# Patient Record
Sex: MALE | Race: WHITE | NOT HISPANIC OR LATINO | Employment: FULL TIME | URBAN - METROPOLITAN AREA
[De-identification: names, ages, dates, MRNs, and addresses within clinical notes are randomized per-mention and may not be internally consistent; named-entity substitution may affect disease eponyms.]

---

## 2019-10-01 ENCOUNTER — TRANSFERRED RECORDS (OUTPATIENT)
Dept: HEALTH INFORMATION MANAGEMENT | Facility: CLINIC | Age: 44
End: 2019-10-01

## 2019-11-14 ENCOUNTER — HOSPITAL ENCOUNTER (OUTPATIENT)
Dept: PHYSICAL THERAPY | Facility: CLINIC | Age: 44
Setting detail: THERAPIES SERIES
End: 2019-11-14
Attending: PHYSICIAN ASSISTANT
Payer: OTHER MISCELLANEOUS

## 2019-11-14 PROCEDURE — 97110 THERAPEUTIC EXERCISES: CPT | Mod: GP

## 2019-11-14 PROCEDURE — 97161 PT EVAL LOW COMPLEX 20 MIN: CPT | Mod: GP

## 2019-11-14 PROCEDURE — 97535 SELF CARE MNGMENT TRAINING: CPT | Mod: GP

## 2019-11-14 NOTE — PROGRESS NOTES
11/14/19 1300   General Information   Type of Visit Initial OP Ortho PT Evaluation   Start of Care Date 11/14/19   Referring Physician Tabatha Steele   Patient/Family Goals Statement get stronger   Orders Evaluate and Treat   Date of Order 11/11/19   Certification Required? No   Medical Diagnosis s/p cervical spinal fusion   Surgical/Medical history reviewed Yes  (na)   Body Part(s)   Body Part(s) Cervical Spine   Presentation and Etiology   Pertinent history of current problem (include personal factors and/or comorbidities that impact the POC) Pt reports having a cervical disc replacement surgery 10/1/19. He was on a 5# lifting restriction which changed to 20# this week. 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 He would like to do some strengthening before returning to work PT for 3 weeks, then FT after that. He is a . Occasionally has to lift 50#. No longer has numbness in R hand.   Impairments A. Pain;F. Decreased strength and endurance   Functional Limitations perform activities of daily living;perform required work activities;perform desired leisure / sports activities  (lifting, leaning over)   Symptom Location post neck and upper back   Onset date of current episode/exacerbation 10/01/19   Chronicity New   Pain rating (0-10 point scale) Best (/10);Worst (/10)   Best (/10) 1  (currently)   Worst (/10) 7  (lifting, active)   Pain quality A. Sharp;C. Aching  (soreness)   Frequency of pain/symptoms C. With activity   Pain/symptoms are: Worse in the morning   Pain/symptoms exacerbated by C. Lifting;L. Work tasks   Pain/symptoms eased by C. Rest   Progression of symptoms since onset: Improved   Prior Level of Function   Functional Level Prior Comment independent   Current Level of Function   Current Community Support Family/friend caregiver   Patient role/employment history A. 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 No   Have you fallen and had an injury in the past year? No   Is patient a fall risk? No   Abuse Screen (yes response referral indicated)   Feels Unsafe at Home or Work/School no   Feels Threatened by Someone no   Does Anyone Try to Keep You From Having Contact with Others or Doing Things Outside Your Home? no   Physical Signs of Abuse Present no   Cervical Spine   Posture fwd head   Cervical Flexion ROM 28*   Cervical Extension ROM 35*   Cervical Right Side Bending ROM 26*   Cervical Left Side Bending ROM 27*   Cervical Right Rotation ROM 50*   Cervical Left Rotation ROM 49*   Shoulder Shrug (C2-C4) Strength 5/5   Shoulder Abd (C5) Strength R 4+/5, L 5/5   Shoulder Add (C7) Strength 5/5   Shoulder ER (C5, C6) Strength R 4/5, L 5/5   Shoulder IR (C5, C6) Strength 5/5   Elbow Flexion (C5, C6) Strength R 4/5, L 5/5   Elbow Extension (C7) Strength R 4/5, L 5/5   Wrist Extension (C6) Strength R 4/5, L 5/5   Wrist Flexion (C7) Strength R 4/5, L 5/5   Thumb Abd (C8) Strength R 4/5, L 5/5   5th Finger Add (T1) Strength 5/5   Palpation tender ant neck, nontender scalenes, cervical parspinals, UT, levator   Planned Therapy Interventions   Planned Therapy Interventions strengthening;ROM;stretching   Clinical Impression   Criteria for Skilled Therapeutic Interventions Met yes, treatment indicated   PT Diagnosis R UE weakness following cervical disc replacement 10/1/19   Influenced by the following impairments weakness, neck pain   Functional limitations due to impairments lifting, driving, work tasks, sleeping   Clinical Presentation Stable/Uncomplicated   Clinical Presentation Rationale clinical judgement   Clinical Decision Making (Complexity) Low complexity   Therapy Frequency 1 time/week   Predicted Duration of Therapy Intervention (days/wks) 8  weeks   Risk & Benefits of therapy have been explained Yes   Patient, Family & other staff in agreement with plan of care Yes   Education Assessment   Preferred Learning Style Listening;Demonstration;Pictures/video   Barriers to Learning No barriers   ORTHO GOALS   PT Ortho Eval Goals 1;2;3;4   Ortho Goal 1   Goal Identifier 1   Goal Description Pt will be able to sleep without waking with pain.   Target Date 12/12/19   Ortho Goal 2   Goal Identifier 2   Goal Description Pt will be able to drive 1 hour with < 3/10 pain.   Target Date 12/26/19   Ortho Goal 3   Goal Identifier 3   Goal Description Pt will be able to lift 20# chair to counter with < 3/10 pain.   Target Date 01/09/20   Ortho Goal 4   Goal Identifier 4   Goal Description Pt will be independent with HEP for optimal functional recovery.   Target Date 01/09/20   Total Evaluation Time   PT Eval, Low Complexity Minutes (09110) 20     Julita Johnson PT

## 2019-11-21 ENCOUNTER — HOSPITAL ENCOUNTER (OUTPATIENT)
Dept: PHYSICAL THERAPY | Facility: CLINIC | Age: 44
Setting detail: THERAPIES SERIES
End: 2019-11-21
Attending: PHYSICIAN ASSISTANT
Payer: OTHER MISCELLANEOUS

## 2019-11-21 PROCEDURE — 97110 THERAPEUTIC EXERCISES: CPT | Mod: GP

## 2020-01-21 NOTE — PROGRESS NOTES
Pt has not returned after 2 treatment sessions with PT. Current status is unknown. Refer to Initial Eval for last known status. Will discharge PT.     Julita Johnson PT

## 2023-03-03 ENCOUNTER — NURSE TRIAGE (OUTPATIENT)
Dept: NURSING | Facility: CLINIC | Age: 48
End: 2023-03-03
Payer: COMMERCIAL

## 2023-03-03 ENCOUNTER — HOSPITAL ENCOUNTER (EMERGENCY)
Facility: CLINIC | Age: 48
Discharge: HOME | End: 2023-03-03
Attending: PHYSICIAN ASSISTANT | Admitting: PHYSICIAN ASSISTANT
Payer: COMMERCIAL

## 2023-03-03 VITALS
TEMPERATURE: 98 F | HEART RATE: 107 BPM | DIASTOLIC BLOOD PRESSURE: 78 MMHG | SYSTOLIC BLOOD PRESSURE: 113 MMHG | RESPIRATION RATE: 20 BRPM | OXYGEN SATURATION: 97 %

## 2023-03-03 DIAGNOSIS — M62.838 MUSCLE SPASM: ICD-10-CM

## 2023-03-03 DIAGNOSIS — S06.0X0A CONCUSSION WITHOUT LOSS OF CONSCIOUSNESS, INITIAL ENCOUNTER: ICD-10-CM

## 2023-03-03 PROCEDURE — G0463 HOSPITAL OUTPT CLINIC VISIT: HCPCS | Performed by: PHYSICIAN ASSISTANT

## 2023-03-03 RX ORDER — METHOCARBAMOL 500 MG/1
1000 TABLET, FILM COATED ORAL 3 TIMES DAILY
Qty: 24 TABLET | Refills: 0 | Status: SHIPPED | OUTPATIENT
Start: 2023-03-03 | End: 2023-03-09

## 2023-03-03 ASSESSMENT — ENCOUNTER SYMPTOMS
HEADACHES: 1
TREMORS: 0
CARDIOVASCULAR NEGATIVE: 1
RESPIRATORY NEGATIVE: 1
NUMBNESS: 1
DIZZINESS: 1
PHOTOPHOBIA: 1
CONSTITUTIONAL NEGATIVE: 1
WEAKNESS: 0
SPEECH DIFFICULTY: 0

## 2023-03-03 ASSESSMENT — ACTIVITIES OF DAILY LIVING (ADL): ADLS_ACUITY_SCORE: 35

## 2023-03-03 NOTE — ED TRIAGE NOTES
Pt reports surgery in 2019 on vertebrae 5-6 from bulge disk and believes he has nerve damage from it.   Numbness in both arms and both legs have intensified since surgery.      PT reports falling on the ice and hitting head on 2/27/23 experiencing dizziness, light sensativity and headaches. Denies losing consciousness.

## 2023-03-03 NOTE — TELEPHONE ENCOUNTER
Pt calling with concerns about;    Fall on 2/27/23 hit his head on car tire and to his right side  Surgery 3 years ago for disc replacement  Numbness of bilateral arms,legs, but states that was present for significant amount of time before this fall and suspects it is related to a back injury from 3 years ago.    Having dizziness, lightheaded, eye sensitivity, migraines have definitely intensed and today having blurry vision.  Has missed work x 4 days due to headache, eye sensitivity     Pt Denies;  Nausea/vomiting  Signs/symptoms of dehydration  Watery or blood tinged fluid from nose or ears    According to the protocol, patient should go to ED/UC now.   Patient is encouraged to also make shannan Care advice given. Patient verbalizes understanding and agrees with plan of care. Transferred to scheduling.     Blanquita Dickinson RN, Nurse Advisor 12:58 PM 3/3/2023  Reason for Disposition    Concussion suspected and has not been examined by a doctor (or NP/PA)    Severe headache    Additional Information    Negative: ACUTE NEUROLOGIC SYMPTOM and symptom present now    Negative: Knocked out (unconscious) > 1 minute    Negative: Seizure (convulsion) occurred  (Exception: Prior history of seizures and now alert and without Acute Neurologic Symptoms.)    Negative: Neck pain after dangerous injury (e.g., MVA, diving, trampoline, contact sports, fall > 10 feet or 3 meters)  (Exception: Neck pain began > 1 hour after injury.)    Negative: Major bleeding (actively dripping or spurting) that can't be stopped    Negative: Penetrating head injury (e.g., knife, gunshot wound, metal object)    Negative: Sounds like a life-threatening emergency to the triager    Negative: Weakness (i.e., paralysis, loss of muscle strength) of the face, arm or leg on one side of the body    Negative: Loss of speech or garbled speech    Negative: Difficult to awaken or acting confused (e.g., disoriented, slurred speech)    Negative: Sounds like a  life-threatening emergency to the triager    Negative: Diagnosed with a concussion within last 14 days    Negative: Can't remember what happened (amnesia)    Negative: Vomiting once or more    Negative: Watery or blood-tinged fluid dripping from the nose or ears    Negative: ACUTE NEUROLOGIC SYMPTOM and now fine    Negative: Knocked out (unconscious) < 1 minute and now fine    Protocols used: CONCUSSION (MTBI) LESS THAN 14 DAYS AGO FOLLOW-UP CALL-A-OH, HEAD INJURY-A-OH

## 2023-03-03 NOTE — ED PROVIDER NOTES
History     Chief Complaint   Patient presents with     Fall     Numbness     Arms and legs      HPI  Nimesh Coronado is a 47 year old male with past medical history of gout, GERD, hyperlipidemia, low back pain and a bulging disc at the level of C5-6 (s/p surgery with an implantable device placed) who presents for evaluation of a closed head injury and concussion symptoms which have been ongoing since he fell on Monday, 2/27/2023.  States that he slipped on the ice and fell down striking the left side of his scalp against the wheel well of his vehicle.  He does report having some dizziness and light sensitivity and mild headaches since that time.  Denies losing consciousness, no neck pain currently.  He has had more numbness in the arms and legs since the incident occurred (waxing and waning).  Has been taking ibuprofen with limited relief of muscle pain and tension.  No chest pain or shortness of breath, no acute vision changes.  Has muscle tension in the trapezius muscles but no midline tenderness in the cervical, thoracic or lumbar spine.    Allergies:  No Known Allergies    Problem List:    There are no problems to display for this patient.       Past Medical History:    No past medical history on file.    Past Surgical History:    No past surgical history on file.    Family History:    No family history on file.    Social History:  Marital Status:  Single [1]        Medications:    methocarbamol (ROBAXIN) 500 MG tablet      Review of Systems   Constitutional: Negative.    HENT: Negative.    Eyes: Positive for photophobia.   Respiratory: Negative.    Cardiovascular: Negative.    Neurological: Positive for dizziness, numbness and headaches. Negative for tremors, syncope, speech difficulty and weakness.       Physical Exam   BP: 113/78  Pulse: 107  Temp: 98  F (36.7  C)  Resp: 20  SpO2: 97 %      Physical Exam  Constitutional:       General: He is not in acute distress.     Appearance: Normal appearance. He is  not ill-appearing, toxic-appearing or diaphoretic.   HENT:      Head: Normocephalic and atraumatic.      Right Ear: Tympanic membrane, ear canal and external ear normal. There is no impacted cerumen.      Left Ear: Tympanic membrane, ear canal and external ear normal. There is no impacted cerumen.      Nose: Nose normal. No congestion or rhinorrhea.      Mouth/Throat:      Mouth: Mucous membranes are moist.      Pharynx: Oropharynx is clear. No oropharyngeal exudate or posterior oropharyngeal erythema.   Eyes:      General: No visual field deficit or scleral icterus.     Extraocular Movements: Extraocular movements intact.      Right eye: Normal extraocular motion and no nystagmus.      Left eye: Normal extraocular motion and no nystagmus.      Pupils: Pupils are equal, round, and reactive to light. Pupils are equal.      Right eye: Pupil is reactive and not sluggish.      Left eye: Pupil is reactive and not sluggish.      Funduscopic exam:     Right eye: Red reflex present.         Left eye: Red reflex present.  Cardiovascular:      Rate and Rhythm: Normal rate and regular rhythm.      Pulses: Normal pulses.      Heart sounds: Normal heart sounds. No murmur heard.  Pulmonary:      Effort: Pulmonary effort is normal. No respiratory distress.      Breath sounds: Normal breath sounds. No stridor. No wheezing, rhonchi or rales.   Chest:      Chest wall: No tenderness.   Abdominal:      General: Abdomen is flat. Bowel sounds are normal. There is no distension.      Palpations: Abdomen is soft. There is no mass.      Tenderness: There is no abdominal tenderness. There is no guarding or rebound.   Musculoskeletal:         General: Normal range of motion.      Cervical back: Normal range of motion and neck supple. No rigidity. No muscular tenderness.   Lymphadenopathy:      Cervical: No cervical adenopathy.   Skin:     General: Skin is warm and dry.   Neurological:      General: No focal deficit present.      Mental  Status: He is alert and oriented to person, place, and time.      Cranial Nerves: Cranial nerves 2-12 are intact. No cranial nerve deficit, dysarthria or facial asymmetry.      Motor: Motor function is intact.      Coordination: Coordination is intact. Romberg sign negative. Coordination normal. Finger-Nose-Finger Test and Heel to Shin Test normal.      Gait: Gait is intact. Gait and tandem walk normal.      Deep Tendon Reflexes: Reflexes normal.      Reflex Scores:       Bicep reflexes are 2+ on the right side and 2+ on the left side.       Brachioradialis reflexes are 2+ on the right side and 2+ on the left side.       Patellar reflexes are 2+ on the right side and 2+ on the left side.     Comments: Negative Adson's test bilaterally.  Strength is 5/5 in the upper extremities.  Normal sensation to light touch in the upper and lower extremities.         ED Course                 Procedures              No results found for this or any previous visit (from the past 24 hour(s)).    Medications - No data to display    Assessments & Plan (with Medical Decision Making)     Patient is a 47-year-old male who presents for evaluation of concussion symptoms including mild headaches, light sensitivity and occasional unsteadiness since a fall which occurred on 3/27/2022.  He denies having any dizziness currently.  Symptoms are consistent with a mild concussion.  Also has spasming of the trapezius muscles bilaterally.  No midline tenderness over the cervical, thoracic or lumbar spine.  No neuromuscular deficits.  Normal strength and sensation to light touch in the upper extremities and lower extremities.    Provided the patient with a concussion referral for further evaluation and management.  Does not meet C-spine rules or [place] head CT rules requiring urgent imaging.  Recommend rest, fluids, and light activities as tolerated for the next 2 days.   Avoid alcohol consumption, operating equipment or driving over the next 2  days.  Follow-up with primary care as scheduled on March 16 for further evaluation and management.    May take ibuprofen/or Tylenol for symptomatic relief of pain.    Prescribed robaxin, which is a muscle relaxant.  Muscle relaxants will make you feel drowsy.  No working, driving, or operating equipment for 8 hours from your last dose of muscle relaxants.     Return to the emergency department for urgent evaluation if you develop symptoms such as repeated vomiting, headache or dizziness, loss of consciousness, unusual or worsening drowsiness, weakness or decreased ability to walk or move, speech or behavior changes, worsening blurry vision, convulsions or seizures, swelling on the face or scalp that gets worse, changes in pupil size, or fluid draining from the nose or ears.    I have reviewed the nursing notes.    I have reviewed the findings, diagnosis, plan and need for follow up with the patient.      Discharge Medication List as of 3/3/2023  1:44 PM      START taking these medications    Details   methocarbamol (ROBAXIN) 500 MG tablet Take 2 tablets (1,000 mg) by mouth 3 times daily for 4 days, Disp-24 tablet, R-0, E-Prescribe             Final diagnoses:   Concussion without loss of consciousness, initial encounter   Muscle spasm       3/3/2023   [facility] EMERGENCY DEPT     Hank Marroquin PA-C  03/04/23 [id number]

## 2023-03-03 NOTE — DISCHARGE INSTRUCTIONS
Recommend rest, fluids, and light activities as tolerated for the next 2 days.   Avoid alcohol consumption, operating equipment or driving over the next 2 days.    May take ibuprofen/or Tylenol for symptomatic relief of pain.    Prescribed robaxin, which is a muscle relaxant.  Muscle relaxants will make you feel drowsy.  No working, driving, or operating equipment for 8 hours from your last dose of muscle relaxants.     Return to the emergency department for urgent evaluation if you develop symptoms such as repeated vomiting, headache or dizziness, loss of consciousness, unusual or worsening drowsiness, weakness or decreased ability to walk or move, speech or behavior changes, worsening blurry vision, convulsions or seizures, swelling on the face or scalp that gets worse, changes in pupil size, or fluid draining from the nose or ears.

## 2023-03-03 NOTE — LETTER
[facility] EMERGENCY DEPT  [address] [address]  Phone: [phone]  Fax: [phone]    March 3, 2023        Nimesh Coronado  [address] [address]          To whom it may concern:    RE: Nimesh Coronado    Patient was seen and treated today at our clinic.  Medical reasons, recommend that he remain out of work until March 6, 2023.  May resume light duties at that time until further evaluated.    Please contact me for questions or concerns.      Sincerely,        Hank Marroquin PA-C

## 2023-03-09 ENCOUNTER — OFFICE VISIT (OUTPATIENT)
Dept: FAMILY MEDICINE | Facility: CLINIC | Age: 48
End: 2023-03-09
Payer: COMMERCIAL

## 2023-03-09 ENCOUNTER — TELEPHONE (OUTPATIENT)
Dept: NEUROLOGY | Facility: CLINIC | Age: 48
End: 2023-03-09

## 2023-03-09 ENCOUNTER — HOSPITAL ENCOUNTER (OUTPATIENT)
Dept: CT IMAGING | Facility: CLINIC | Age: 48
Discharge: HOME | End: 2023-03-09
Attending: NURSE PRACTITIONER | Admitting: NURSE PRACTITIONER
Payer: COMMERCIAL

## 2023-03-09 VITALS
SYSTOLIC BLOOD PRESSURE: 138 MMHG | RESPIRATION RATE: 16 BRPM | BODY MASS INDEX: 32.83 KG/M2 | HEART RATE: 99 BPM | HEIGHT: 67 IN | WEIGHT: 209.2 LBS | DIASTOLIC BLOOD PRESSURE: 76 MMHG | TEMPERATURE: 97.9 F | OXYGEN SATURATION: 98 %
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
## 2023-03-09 DIAGNOSIS — R20.0 NUMBNESS AND TINGLING OF BOTH UPPER EXTREMITIES: ICD-10-CM

## 2023-03-09 DIAGNOSIS — R29.810 WEAKNESS OF FACE MUSCLES: ICD-10-CM

## 2023-03-09 DIAGNOSIS — R20.0 NUMBNESS AND TINGLING OF BOTH LOWER EXTREMITIES: ICD-10-CM

## 2023-03-09 DIAGNOSIS — R29.90 ALTERATION IN NEUROLOGICAL STATUS IN ADULT: ICD-10-CM

## 2023-03-09 DIAGNOSIS — R20.2 NUMBNESS AND TINGLING OF BOTH UPPER EXTREMITIES: ICD-10-CM

## 2023-03-09 DIAGNOSIS — R20.2 NUMBNESS AND TINGLING OF BOTH LOWER EXTREMITIES: ICD-10-CM
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
## 2023-03-09 PROCEDURE — 99214 OFFICE O/P EST MOD 30 MIN: CPT | Performed by: NURSE PRACTITIONER

## 2023-03-09 PROCEDURE — 70450 CT HEAD/BRAIN W/O DYE: CPT

## 2023-03-09 RX ORDER — IBUPROFEN 200 MG
200 TABLET ORAL EVERY 4 HOURS PRN
COMMUNITY

## 2023-03-09 ASSESSMENT — PATIENT HEALTH QUESTIONNAIRE - PHQ9
10. IF YOU CHECKED OFF ANY PROBLEMS, HOW DIFFICULT HAVE THESE PROBLEMS MADE IT FOR YOU TO DO YOUR WORK, TAKE CARE OF THINGS AT HOME, OR GET ALONG WITH OTHER PEOPLE: VERY DIFFICULT
5. POOR APPETITE OR OVEREATING: NEARLY EVERY DAY
SUM OF ALL RESPONSES TO PHQ QUESTIONS 1-9: 22
SUM OF ALL RESPONSES TO PHQ QUESTIONS 1-9: 22

## 2023-03-09 ASSESSMENT — ANXIETY QUESTIONNAIRES
GAD7 TOTAL SCORE: 21
3. WORRYING TOO MUCH ABOUT DIFFERENT THINGS: NEARLY EVERY DAY
2. NOT BEING ABLE TO STOP OR CONTROL WORRYING: NEARLY EVERY DAY
6. BECOMING EASILY ANNOYED OR IRRITABLE: NEARLY EVERY DAY
5. BEING SO RESTLESS THAT IT IS HARD TO SIT STILL: NEARLY EVERY DAY
7. FEELING AFRAID AS IF SOMETHING AWFUL MIGHT HAPPEN: NEARLY EVERY DAY
GAD7 TOTAL SCORE: 21
IF YOU CHECKED OFF ANY PROBLEMS ON THIS QUESTIONNAIRE, HOW DIFFICULT HAVE THESE PROBLEMS MADE IT FOR YOU TO DO YOUR WORK, TAKE CARE OF THINGS AT HOME, OR GET ALONG WITH OTHER PEOPLE: VERY DIFFICULT
1. FEELING NERVOUS, ANXIOUS, OR ON EDGE: NEARLY EVERY DAY

## 2023-03-09 ASSESSMENT — PAIN SCALES - GENERAL: PAINLEVEL: SEVERE PAIN (7)

## 2023-03-09 NOTE — TELEPHONE ENCOUNTER
Health Call Center    Phone Message    May a detailed message be left on voicemail: yes     Reason for Call: Other: Pt is calling to schedule appt for concussion. Pt is scheduled with Corinne on 05/24 but referral is marked urgent 3-5 days. Please assist pt in finding a sooner appt. Pt is okay going to [place] but preferred location is [place].     Action Taken: Message routed to:  Other: [facility]    Travel Screening: Not Applicable

## 2023-03-09 NOTE — PATIENT INSTRUCTIONS
Make appointment with radiology by calling [phone] for MRI of brain and MRA of brain as well.  I will notify you of results.  Make appointment with neurology for evaluation.  Follow-up in ER if any significant changes.

## 2023-03-09 NOTE — PROGRESS NOTES
Assessment & Plan     Fall, initial encounter  Patient has some right lower facial neuro changes with corner of mouth turned down but normal upper facial movement in the eyes not consistent with Bell's palsy, weakness is bilateral and neuro exam is negative except the right side mouth drooping.  He also is having numbness and tingling with some weakness in both arms and legs not consistent with his cervical history.  Due to fall and concussion ordered CT head without contrast for rule out subdural hematoma or other cause for changes in neuro exam.  This was negative.  Plan to follow-up with MRI of brain and MRA of brain in for further evaluation since he is stable.  Also refer to neurology since this is abnormal in concussion recovery.  I have advised patient it would probably be beneficial for him also to follow-up with the concussion clinic since he did have a referral and this number was given to him as well.  I will be in touch with him once he completes his MRI and MRA with results as soon as he can get it.  - CT Head w/o Contrast; Future    Weakness of face muscles  - CT Head w/o Contrast; Future  - MR Brain w/o & w Contrast; Future  - MRA Brain ([place]) wo & w Contrast; Future  - Adult Neurology  Referral; Future    Numbness and tingling of both lower extremities  - CT Head w/o Contrast; Future  - MR Brain w/o & w Contrast; Future  - MRA Brain ([place]) wo & w Contrast; Future  - Adult Neurology  Referral; Future    Numbness and tingling of both upper extremities  - CT Head w/o Contrast; Future  - MR Brain w/o & w Contrast; Future  - MRA Brain ([place]) wo & w Contrast; Future  - Adult Neurology  Referral; Future    Alteration in neurological status in adult  - MR Brain w/o & w Contrast; Future  - MRA Brain ([place]) wo & w Contrast; Future  - Adult Neurology  Referral; Future    MED REC REQUIRED  Post Medication Reconciliation Status:  discharge medications reconciled, continue medications without change    Depression Screening Follow Up    PHQ 3/9/2023   PHQ-9 Total Score 22   Q9: Thoughts of better off dead/self-harm past 2 weeks Several days   F/U: Thoughts of suicide or self-harm Yes   F/U: Self harm-plan No   F/U: Self-harm action No   F/U: Safety concerns No     Patient declined discussion of mood today and will postpone until he is feeling better with his dizziness and weakness.    Paulina Alejandro NP  [facility] is a 47 year old, presenting for the following health issues:  Anxiety, Fall, Hospital F/U, and Depression    Patient declined vaccines today .  History of Present Illness       Reason for visit:  Fall    He eats 0-1 servings of fruits and vegetables daily.He consumes 6 sweetened beverage(s) daily.He exercises with enough effort to increase his heart rate 9 or less minutes per day.  He exercises with enough effort to increase his heart rate 3 or less days per week. He is missing 1 dose(s) of medications per week.  He is not taking prescribed medications regularly due to side effects.    Today's PHQ-9         PHQ-9 Total Score: 22    PHQ-9 Q9 Thoughts of better off dead/self-harm past 2 weeks :   Several days  Thoughts of suicide or self harm: (P) Yes  Self-harm Plan:   (P) No  Self-harm Action:     (P) No  Safety concerns for self or others: (P) No    How difficult have these problems made it for you to do your work, take care of things at home, or get along with other people: Very difficult    Patient presents today with depression on his PHQ-9 score.  Patient denies any suicidal plan.  He does not feel that this is his main concern at this time and will make appointment to readdress when he feels improved from his recent fall.    Hospital Follow-up Visit:    Hospital/Nursing Home/IP Rehab Facility: [facility]  Date of Admission: 03/03/2023  Date of  Discharge: 03/03/2023  Reason(s) for Admission: Concussion without loss of consciousness    Was your hospitalization related to COVID-19? No   Problems taking medications regularly:  None  Medication changes since discharge: None  Problems adhering to non-medication therapy:  None    Summary of hospitalization:  [facility] discharge summary reviewed  Diagnostic Tests/Treatments reviewed.  Follow up needed: Patient is having numbness tingling down arms and legs, weakness in arms and legs bilaterally, and continues to have headaches and dizziness with movement.  He also states he is feeling some sensitivity to light.  Other Healthcare Providers Involved in Patient s Care:         None  Update since discharge: worsened.    Plan of care communicated with patient        Social History     Tobacco Use     Smoking status: Never     Smokeless tobacco: Never   Vaping Use     Vaping Use: Never used     OMARI-7 SCORE 3/9/2023   Total Score 21     PHQ 3/9/2023   PHQ-9 Total Score 22   Q9: Thoughts of better off dead/self-harm past 2 weeks Several days   F/U: Thoughts of suicide or self-harm Yes   F/U: Self harm-plan No   F/U: Self-harm action No   F/U: Safety concerns No     Last PHQ-9 3/9/2023   1.  Little interest or pleasure in doing things 2   2.  Feeling down, depressed, or hopeless 3   3.  Trouble falling or staying asleep, or sleeping too much 3   4.  Feeling tired or having little energy 3   5.  Poor appetite or overeating 3   6.  Feeling bad about yourself 2   7.  Trouble concentrating 3   8.  Moving slowly or restless 2   Q9: Thoughts of better off dead/self-harm past 2 weeks 1   PHQ-9 Total Score 22   In the past two weeks have you had thoughts of suicide or self harm? Yes   Do you have concerns about your personal safety or the safety of others? No   In the past 2 weeks have you thought about a plan or had intention to harm yourself? No   In the past 2 weeks have you acted on these thoughts in any way?  "No     OMARI-7  3/9/2023   1. Feeling nervous, anxious, or on edge 3   2. Not being able to stop or control worrying 3   3. Worrying too much about different things 3   4. Trouble relaxing 3   5. Being so restless that it is hard to sit still 3   6. Becoming easily annoyed or irritable 3   7. Feeling afraid, as if something awful might happen 3   OMARI-7 Total Score 21   If you checked any problems, how difficult have they made it for you to do your work, take care of things at home, or get along with other people? Very difficult     Review of Systems   CONSTITUTIONAL: NEGATIVE for fever, chills, change in weight  RESP: NEGATIVE for significant cough or SOB  CV: NEGATIVE for chest pain, palpitations or peripheral edema  MUSCULOSKELETAL: POSITIVE  for paresthesias and weakness in arms and legs bilaterally  NEURO: POSITIVE for headaches and sensitivity to light  along with dizziness with movement and looking up.  PSYCHIATRIC: POSITIVE fordepressed mood  ROS otherwise negative      Objective    /76   Pulse 99   Temp 97.9  F (36.6  C) (Tympanic)   Resp 16   Ht 1.702 m (5' 7\")   Wt 94.9 kg (209 lb 3.2 oz)   SpO2 98%   BMI 32.77 kg/m    Body mass index is 32.77 kg/m .  Physical Exam   GENERAL: healthy, alert and no distress  EYES: Eyes grossly normal to inspection and dizziness occurred with following my finger towards his eyes  HENT: ear canals and TM's normal, nose and mouth without ulcers or lesions  NECK: no adenopathy and no asymmetry, masses, or scars  RESP: lungs clear to auscultation - no rales, rhonchi or wheezes  CV: regular rate and rhythm, normal S1 S2, no S3 or S4, no murmur, click or rub, no peripheral edema and peripheral pulses strong  MS: RUE exam shows ROM of all joints is normal, LUE exam shows ROM of all joints is normal, RLE exam shows ROM of all joints is normal and LLE exam shows ROM of all joints is normal; generalized weakness in upper and lower extremities, paresthesias are intermittent " and not occurring currently  NEURO: Normal strength and tone, sensory exam grossly normal, mentation intact, speech normal, cranial nerves 2-12 intact except for corner of right side of the mouth not going up with smiling and Romberg normal  Comprehensive back pain exam:  No tenderness, Range of motion not limited by pain, Lower extremity strength functional and equal on both sides, Lower extremity reflexes within normal limits bilaterally, Lower extremity sensation normal and equal on both sides and Straight leg raise negative bilaterally  PSYCH: mentation appears normal and affect flat    CT Head w/o Contrast    Result Date: 3/9/2023    CT SCAN OF THE HEAD WITHOUT CONTRAST   3/9/2023 9:40 AM HISTORY: Fall, initial encounter. Weakness of face muscles. Numbness and tingling of both lower extremities. Numbness and tingling of both upper extremities.     TECHNIQUE:  Axial images of the head and coronal reformations without IV contrast material. Radiation dose for this scan was reduced using automated exposure control, adjustment of the mA and/or kV according to patient size, or iterative reconstruction technique.     COMPARISON: None.     FINDINGS: No evidence of ischemia, hemorrhage, mass, mass effect, or hydrocephalus. Parenchyma within normal limits for age. No acute osseous abnormality.     IMPRESSION: No acute intracranial abnormality. Findings were called to Paulina Alejandro at 9:47 AM on 3/9/2023     FRANCISCA FREITAS MD   SYSTEM ID:  [id number]

## 2023-03-09 NOTE — LETTER
[facility]  [address]  [place] [address]  Phone: [phone]    March 9, 2023        Nimesh Coronado  [address]  [place] [address]          To whom it may concern:    RE: Nimesh Coronado    Patient was seen and treated today at our clinic.  He suffered concussion and has residual symptoms.  He will not be able to return to work until he is not having light sensitivity, headaches, or dizziness persistently.  At this time, he is undergoing further evaluation and follow-up with specialist.  If symptoms improve, he can start with shorter shifts of 4 hours and increase up to 8 hour and then 12 hours as able without symptoms.  Follow concussion return to work policy if you have one.    Please contact me for questions or concerns.      Sincerely,        Paulina Alejandro NP

## 2023-03-13 ENCOUNTER — HOSPITAL ENCOUNTER (OUTPATIENT)
Dept: MRI IMAGING | Facility: CLINIC | Age: 48
Discharge: HOME | End: 2023-03-13
Attending: NURSE PRACTITIONER
Payer: COMMERCIAL

## 2023-03-13 DIAGNOSIS — R20.2 NUMBNESS AND TINGLING OF BOTH UPPER EXTREMITIES: ICD-10-CM

## 2023-03-13 DIAGNOSIS — R29.90 ALTERATION IN NEUROLOGICAL STATUS IN ADULT: ICD-10-CM

## 2023-03-13 DIAGNOSIS — R20.0 NUMBNESS AND TINGLING OF BOTH UPPER EXTREMITIES: ICD-10-CM

## 2023-03-13 DIAGNOSIS — R20.2 NUMBNESS AND TINGLING OF BOTH LOWER EXTREMITIES: ICD-10-CM

## 2023-03-13 DIAGNOSIS — R29.810 WEAKNESS OF FACE MUSCLES: ICD-10-CM

## 2023-03-13 DIAGNOSIS — R20.0 NUMBNESS AND TINGLING OF BOTH LOWER EXTREMITIES: ICD-10-CM

## 2023-03-13 PROCEDURE — 255N000002 HC RX 255 OP 636: Performed by: NURSE PRACTITIONER

## 2023-03-13 PROCEDURE — A9585 GADOBUTROL INJECTION: HCPCS | Performed by: NURSE PRACTITIONER

## 2023-03-13 PROCEDURE — 70553 MRI BRAIN STEM W/O & W/DYE: CPT

## 2023-03-13 PROCEDURE — 70544 MR ANGIOGRAPHY HEAD W/O DYE: CPT | Mod: XU

## 2023-03-13 RX ORDER — GADOBUTROL 604.72 MG/ML
9 INJECTION INTRAVENOUS ONCE
Status: COMPLETED | OUTPATIENT
Start: 2023-03-13 | End: 2023-03-13

## 2023-03-13 RX ADMIN — GADOBUTROL 9 ML: 604.72 INJECTION INTRAVENOUS at 17:54

## 2023-03-14 ENCOUNTER — TELEPHONE (OUTPATIENT)
Dept: FAMILY MEDICINE | Facility: CLINIC | Age: 48
End: 2023-03-14
Payer: COMMERCIAL

## 2023-03-15 NOTE — TELEPHONE ENCOUNTER
[facility] release of information form received from patient. Form is signed. There is no other form attached, just a release of information. Form is sent to [facility]KA and copy placed in cabinet and copy sent to scan.

## 2023-03-16 ENCOUNTER — VIRTUAL VISIT (OUTPATIENT)
Dept: NEUROLOGY | Facility: CLINIC | Age: 48
End: 2023-03-16
Attending: PHYSICIAN ASSISTANT
Payer: COMMERCIAL

## 2023-03-16 DIAGNOSIS — S06.0X0A CONCUSSION WITHOUT LOSS OF CONSCIOUSNESS, INITIAL ENCOUNTER: ICD-10-CM

## 2023-03-16 DIAGNOSIS — F07.81 POST CONCUSSION SYNDROME: Primary | ICD-10-CM

## 2023-03-16 DIAGNOSIS — M54.2 NECK PAIN: ICD-10-CM

## 2023-03-16 DIAGNOSIS — G47.00 INSOMNIA, UNSPECIFIED TYPE: ICD-10-CM

## 2023-03-16 DIAGNOSIS — R41.840 ATTENTION AND CONCENTRATION DEFICIT: ICD-10-CM

## 2023-03-16 PROCEDURE — 99205 OFFICE O/P NEW HI 60 MIN: CPT | Mod: VID | Performed by: NURSE PRACTITIONER

## 2023-03-16 RX ORDER — AMITRIPTYLINE HYDROCHLORIDE 50 MG/1
50-100 TABLET ORAL AT BEDTIME
Qty: 60 TABLET | Refills: 3 | Status: SHIPPED | OUTPATIENT
Start: 2023-03-16

## 2023-03-16 RX ORDER — BUPROPION HYDROCHLORIDE 150 MG/1
150 TABLET ORAL EVERY MORNING
Qty: 30 TABLET | Refills: 3 | Status: SHIPPED | OUTPATIENT
Start: 2023-03-16

## 2023-03-16 RX ORDER — METHOCARBAMOL 500 MG/1
500 TABLET, FILM COATED ORAL 4 TIMES DAILY PRN
COMMUNITY
End: 2023-03-22

## 2023-03-16 ASSESSMENT — PATIENT HEALTH QUESTIONNAIRE - PHQ9
SUM OF ALL RESPONSES TO PHQ QUESTIONS 1-9: 18
10. IF YOU CHECKED OFF ANY PROBLEMS, HOW DIFFICULT HAVE THESE PROBLEMS MADE IT FOR YOU TO DO YOUR WORK, TAKE CARE OF THINGS AT HOME, OR GET ALONG WITH OTHER PEOPLE: EXTREMELY DIFFICULT
SUM OF ALL RESPONSES TO PHQ QUESTIONS 1-9: 18
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
## 2023-03-16 NOTE — NURSING NOTE
CONCUSSION SYMPTOMS ASSESSMENT 3/16/2023   Headache or Pressure In Head 3 - moderate   Upset Stomach or Throwing Up 0 - none   Problems with Balance 3 - moderate   Feeling Dizzy 1 - mild   Sensitivity to Light 4 - moderate to severe   Sensitivity to Noise 1 - mild   Mood Changes 4 - moderate to severe   Feeling sluggish, hazy, or foggy 3 - moderate   Trouble Concentrating, Lack of Focus 4 - moderate to severe   Motion Sickness 0 - none   Vision Changes 0 - none   Memory Problems 1 - mild   Feeling Confused 3 - moderate   Neck Pain 4 - moderate to severe   Trouble Sleeping 5 - severe   Total Number of Symptoms 12   Symptom Severity Score 36       Is the patient currently in the state of [place]? YES    Visit mode:VIDEO    If the visit is dropped, the patient can be reconnected by: VIDEO VISIT: Text to cell phone: [phone]    Will anyone else be joining the visit? NO      How would you like to obtain your AVS? MyChart    Are changes needed to the allergy or medication list? NO    Reason for visit: Concussion
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
## 2023-03-20 ENCOUNTER — TELEPHONE (OUTPATIENT)
Dept: FAMILY MEDICINE | Facility: CLINIC | Age: 48
End: 2023-03-20
Payer: COMMERCIAL

## 2023-03-20 ENCOUNTER — TELEPHONE (OUTPATIENT)
Dept: ORTHOPEDICS | Facility: CLINIC | Age: 48
End: 2023-03-20
Payer: COMMERCIAL

## 2023-03-20 NOTE — TELEPHONE ENCOUNTER
Pt is experience next pain with numbness and tingling in both arms and hands. HX surgery through [facility] for a C5 disc replacement in 2019. Pt is schedule with Dr. Rodriguez on 3/22, please advise if appt is appropriate or does pt need to reschedule?

## 2023-03-20 NOTE — TELEPHONE ENCOUNTER
Action March 20, 2023 1:28 PM [place]   Action Taken Sent a request for imaging from  [phone] and [facility] [phone] .     Action March 20, 2023 4:30 PM MT   Action Taken Imaging resolved.       DIAGNOSIS: Neck Pain   APPOINTMENT DATE: 03/22/2023   NOTES STATUS DETAILS   OFFICE NOTE from referring provider Internal 03/16/2023 - Corinne Silva CNP - [facility] Neuro    OFFICE NOTE from other specialist Internal  Care Everywhere 03/09/2023 - Paulina Alejandro NP - [facility] FP    01/08/2020 - Guido Yu MD - [facility]    11/11/2019 - Tabatha White PA-C - [facility]    More..   DIS[facility] RE[facility] the ER [facility]   OPERATIVE REPORT Care Everywhere 10/01/2019 - C6-C7 Disk Arthroplasty   MEDICATION LIST Internal  Care Everywhere    IMPLANT RECORD/STICKER Care Everywhere    LABS     INJECTIONS DONE IN RADIOLOGY PACS [facility]:  07/30/2019, 06/21/2019   MRI PACS [facility]:  05/24/2019- [facility]   XRAYS (IMAGES & REPORTS) PACS [facility]:  01/08/2020, 11/11/2019, 10/10/2019 - [facility]    [facility]:  10/01/2019 - [facility]

## 2023-03-20 NOTE — TELEPHONE ENCOUNTER
Patient wondering if records have been sent to [facility] as he completed a authorization to release information on 3/17/23.     Informed patient they have not been sent yet, form sent to [facility] for processing.    Patient to check back in a couple days.    Julie Behrendt RN

## 2023-03-22 ENCOUNTER — OFFICE VISIT (OUTPATIENT)
Dept: ORTHOPEDICS | Facility: CLINIC | Age: 48
End: 2023-03-22
Attending: NURSE PRACTITIONER
Payer: COMMERCIAL

## 2023-03-22 ENCOUNTER — PRE VISIT (OUTPATIENT)
Dept: ORTHOPEDICS | Facility: CLINIC | Age: 48
End: 2023-03-22

## 2023-03-22 ENCOUNTER — ANCILLARY PROCEDURE (OUTPATIENT)
Dept: GENERAL RADIOLOGY | Facility: CLINIC | Age: 48
End: 2023-03-22
Attending: ORTHOPAEDIC SURGERY
Payer: COMMERCIAL

## 2023-03-22 VITALS — WEIGHT: 208 LBS | HEIGHT: 67 IN | BODY MASS INDEX: 32.65 KG/M2

## 2023-03-22 DIAGNOSIS — M54.12 CERVICAL RADICULOPATHY: ICD-10-CM

## 2023-03-22 DIAGNOSIS — M48.02 CERVICAL SPINAL STENOSIS: ICD-10-CM

## 2023-03-22 DIAGNOSIS — M54.2 NECK PAIN: ICD-10-CM

## 2023-03-22 DIAGNOSIS — F07.81 POST CONCUSSION SYNDROME: ICD-10-CM

## 2023-03-22 DIAGNOSIS — M54.2 NECK PAIN: Primary | ICD-10-CM

## 2023-03-22 DIAGNOSIS — G95.9 MYELOPATHY (H): Primary | ICD-10-CM

## 2023-03-22 DIAGNOSIS — M50.30 DEGENERATIVE, INTERVERTEBRAL DISC, CERVICAL: ICD-10-CM

## 2023-03-22 PROCEDURE — 99204 OFFICE O/P NEW MOD 45 MIN: CPT | Performed by: ORTHOPAEDIC SURGERY

## 2023-03-22 PROCEDURE — 72050 X-RAY EXAM NECK SPINE 4/5VWS: CPT | Mod: GC | Performed by: RADIOLOGY

## 2023-03-22 RX ORDER — METHOCARBAMOL 500 MG/1
500 TABLET, FILM COATED ORAL 4 TIMES DAILY PRN
Qty: 30 TABLET | Refills: 3 | Status: SHIPPED | OUTPATIENT
Start: 2023-03-22

## 2023-03-22 RX ORDER — GABAPENTIN 300 MG/1
300 CAPSULE ORAL 3 TIMES DAILY
Qty: 60 CAPSULE | Refills: 3 | Status: SHIPPED | OUTPATIENT
Start: 2023-03-22

## 2023-03-22 NOTE — LETTER
3/22/2023         RE: Nimesh Coronado  [address]  [facility] [address]        Dear Colleague,    Thank you for referring your patient, Nimesh Coronado, to the [facility] [place]. Please see a copy of my visit note below.    Chief concern: Cervical radiculopathy, loss of dexterity and balance     History of present illness:  Nimesh Coronado is a pleasant 47-year-old male with history of 2019 C6-7 cervical disc replacement with Dr. Yu. Had improved symptoms up until this winter when he started having intermittent pains and numbness in BUE.  He had contacted Dr. Yu's office to get an appointment unfortunately were earliest available time was March 16 and 1 week prior to his appointment they called and canceled that.  He therefore reached out and scheduled for evaluation here.  He describes numbness bilaterally in the C6-C7 distribution of the hands, pain dorsal forearms, pain in neck.  Symptoms are made difficult to sleep, standard drive, work in his job as a .  Alleviating factors have included pain medications muscle relaxants.  He had recent physical therapy or epidural injection.  He has not had gabapentin or Neurontin.    On examination is alert and oriented no acute distress  Nonlabored respiration with no audible wheeze  Walks without assistive device without obvious instability  Cervical range of motion is Flexion/extension is 35/40, Lateral rotation is 50 L, 55 R, Side bend is 45 L, 40 R  Resisted strength testing reveals 5/5 strength with shoulder elevation, 5 -/5 shoulder abduction bilaterally, elbow flexion is 4/5 on the right 5 -/5 in the left, elbow extension is 5 -/5 on the right and 5/5 in the left, wrist flexion extension 5 -/5 on the right 5/5 in the left, finger abduction and composite  are 5/5 bilaterally  Sensation is grossly intact gliding light touch C4-T1.  No evidence of hyperreflexia at the bicep tricep or brachioradialis.  He has  negative Ifrah's bilaterally.  He has 2+ patellar tendon reflexes and no sustained clonus  Negative Romberg    Imaging review including AP lateral flexion-extension views of the cervical spine reveals C6-7 cervical disc replacement which has 7 degree change in intervertebral angle between flexion-extension.  There is mild spondylosis at C5-6.  Review of his 2019 MRI did show a predominantly right-sided C5-6 small disc herniation as well as a very large predominately right-sided C6-7 disc herniation which was presumably treated with his discectomy and disc replacement.    Impression and plan:  47-year-old male with history of 2019 C6-7 disc replacement with Dr. Yu who presents with symptoms of cervical radiculopathy and myelopathic symptoms.  He does not have objective findings of cervical myelopathy.  Still given his symptoms I recommend MRI of the cervical spine and we will plan to have a video visit in 2 weeks to review the imaging.  Counseled the patient on potential treatment options depending on the imaging which could include physical therapy or injections versus time sensitive surgery to decompress the spinal cord depending on the MRI findings.  He had several well-informed questions which I answered for him to the best my medical ability.  We will follow-up in 2 weeks as discussed.    Time spent on this clinical encounter including previsit chart review, history and physical examination, patient counseling and documentation was 45 minutes on the date of encounter.    Dmitri Rodriguez MD  Orthopedic Spine Surgeon  , Department of Orthopedic Surgery

## 2023-03-22 NOTE — PROGRESS NOTES
Chief concern: Cervical radiculopathy, loss of dexterity and balance     History of present illness:  Nimesh Coronado is a pleasant 47-year-old male with history of 2019 C6-7 cervical disc replacement with Dr. Yu. Had improved symptoms up until this winter when he started having intermittent pains and numbness in BUE.  He had contacted Dr. Yu's office to get an appointment unfortunately were earliest available time was March 16 and 1 week prior to his appointment they called and canceled that.  He therefore reached out and scheduled for evaluation here.  He describes numbness bilaterally in the C6-C7 distribution of the hands, pain dorsal forearms, pain in neck.  Symptoms are made difficult to sleep, standard drive, work in his job as a .  Alleviating factors have included pain medications muscle relaxants.  He had recent physical therapy or epidural injection.  He has not had gabapentin or Neurontin.    On examination is alert and oriented no acute distress  Nonlabored respiration with no audible wheeze  Walks without assistive device without obvious instability  Cervical range of motion is Flexion/extension is 35/40, Lateral rotation is 50 L, 55 R, Side bend is 45 L, 40 R  Resisted strength testing reveals 5/5 strength with shoulder elevation, 5 -/5 shoulder abduction bilaterally, elbow flexion is 4/5 on the right 5 -/5 in the left, elbow extension is 5 -/5 on the right and 5/5 in the left, wrist flexion extension 5 -/5 on the right 5/5 in the left, finger abduction and composite  are 5/5 bilaterally  Sensation is grossly intact gliding light touch C4-T1.  No evidence of hyperreflexia at the bicep tricep or brachioradialis.  He has negative Ifrah's bilaterally.  He has 2+ patellar tendon reflexes and no sustained clonus  Negative Romberg    Imaging review including AP lateral flexion-extension views of the cervical spine reveals C6-7 cervical disc replacement which has 7 degree change  in intervertebral angle between flexion-extension.  There is mild spondylosis at C5-6.  Review of his 2019 MRI did show a predominantly right-sided C5-6 small disc herniation as well as a very large predominately right-sided C6-7 disc herniation which was presumably treated with his discectomy and disc replacement.    Impression and plan:  47-year-old male with history of 2019 C6-7 disc replacement with Dr. Yu who presents with symptoms of cervical radiculopathy and myelopathic symptoms.  He does not have objective findings of cervical myelopathy.  Still given his symptoms I recommend MRI of the cervical spine and we will plan to have a video visit in 2 weeks to review the imaging.  Counseled the patient on potential treatment options depending on the imaging which could include physical therapy or injections versus time sensitive surgery to decompress the spinal cord depending on the MRI findings.  He had several well-informed questions which I answered for him to the best my medical ability.  We will follow-up in 2 weeks as discussed.    Time spent on this clinical encounter including previsit chart review, history and physical examination, patient counseling and documentation was 45 minutes on the date of encounter.    Dmitri Rodriguez MD  Orthopedic Spine Surgeon  , Department of Orthopedic Surgery

## 2023-03-22 NOTE — NURSING NOTE
"Reason For Visit:   Chief Complaint   Patient presents with     Consult     Neck pain, C5 disc replacement 2019 at [facility] Dr. Guido Yu, numbness and tingling in both arms and hands / Corinne Silva APRN CNP        Primary MD: Mauricio Vivar  Ref. MD: Corinne Silva    ?  No  Occupation .  Currently working? No.  Work status?  On medical leave.    Date of injury: No  Type of injury: did fall on 2/27/23.    Date of surgery: 10/1/2019  Type of surgery: Cervical disc replacement C5-6.    Smoker: No  Request smoking cessation information: No    Ht 1.706 m (5' 7.17\")   Wt 94.3 kg (208 lb)   BMI 32.42 kg/m      Pain Assessment  Patient Currently in Pain: Yes  0-10 Pain Scale: 6  Primary Pain Location: Neck        Neck Disability Index (NDI) Questionnaire    Neck Disability Index (NDI) 3/22/2023   Neck Disability Index: Count 10   NDI: Total Score = SUM (points for all 10 findings) 34   Neck Disability in Percent = (Total Score) / 50 * 100 68 (%)   Some recent data might be hidden              Visual Analog Pain Scale  Back Pain Scale 0-10: 0  Right leg pain: 0  Left leg pain: 0  Neck Pain Scale 0-10: 6  Right arm pain: 0 (goes numb)  Left arm pain: 0 (goes numb)    Promis 10 Assessment    No flowsheet data found.             Moni Lee LPN  "

## 2023-03-23 ENCOUNTER — HOSPITAL ENCOUNTER (OUTPATIENT)
Dept: MRI IMAGING | Facility: CLINIC | Age: 48
Discharge: HOME | End: 2023-03-23
Attending: ORTHOPAEDIC SURGERY | Admitting: ORTHOPAEDIC SURGERY
Payer: COMMERCIAL

## 2023-03-23 DIAGNOSIS — M50.30 DEGENERATIVE, INTERVERTEBRAL DISC, CERVICAL: ICD-10-CM

## 2023-03-23 DIAGNOSIS — M54.2 NECK PAIN: ICD-10-CM

## 2023-03-23 DIAGNOSIS — M48.02 CERVICAL SPINAL STENOSIS: ICD-10-CM

## 2023-03-23 PROCEDURE — 72141 MRI NECK SPINE W/O DYE: CPT

## 2023-03-24 NOTE — TELEPHONE ENCOUNTER
Patient calling again to see if records were sent to Med Records. Patient wanting to know status, please call patient at [phone]. Julita Brown on 3/24/2023 at 8:31 AM

## 2023-03-27 ENCOUNTER — TELEPHONE (OUTPATIENT)
Dept: ORTHOPEDICS | Facility: CLINIC | Age: 48
End: 2023-03-27
Payer: COMMERCIAL

## 2023-03-27 NOTE — TELEPHONE ENCOUNTER
[facility] Call Center    Phone Message    May a detailed message be left on voicemail: yes     Reason for Call: Other: Pt calling to see if he can mri results from 03/23 and next steps before his scheduled appt on 04/07 . Provider schedule does not show avail sooner put on waiting list for cancellations . Please call pt for other options      Action Taken: Other: ortho     Travel Screening: Not Applicable

## 2023-03-27 NOTE — TELEPHONE ENCOUNTER
Writer called and talked with patient on the phone. Hungr was able to schedule pt for video visit for 3/29/23 at 1:40 pm.     Moni Lee LPN

## 2023-03-28 NOTE — TELEPHONE ENCOUNTER
[facility] release of information form received from patient. Form is signed. There is no other form attached, just a release of information. Form is sent to [facility] and copy placed in cabinet and copy sent to scan. I do not know if Med Records sent patient's chart notes. He would have to follow up with Medical Records.     Too late to call patient to notify.

## 2023-03-28 NOTE — TELEPHONE ENCOUNTER
Pt was notified, and was advised to follow up with medical records; he verbalized understanding.    Merle Yao RN  [facility]

## 2023-03-29 ENCOUNTER — VIRTUAL VISIT (OUTPATIENT)
Dept: ORTHOPEDICS | Facility: CLINIC | Age: 48
End: 2023-03-29
Payer: COMMERCIAL

## 2023-03-29 ENCOUNTER — MYC MEDICAL ADVICE (OUTPATIENT)
Dept: FAMILY MEDICINE | Facility: CLINIC | Age: 48
End: 2023-03-29

## 2023-03-29 DIAGNOSIS — Z98.890 S/P CERVICAL DISC REPLACEMENT: ICD-10-CM

## 2023-03-29 DIAGNOSIS — M48.02 CERVICAL STENOSIS OF SPINE: ICD-10-CM

## 2023-03-29 DIAGNOSIS — M54.12 CERVICAL RADICULOPATHY: Primary | ICD-10-CM

## 2023-03-29 PROCEDURE — 99213 OFFICE O/P EST LOW 20 MIN: CPT | Mod: VID | Performed by: ORTHOPAEDIC SURGERY

## 2023-03-29 NOTE — LETTER
3/29/2023         RE: Nimesh Coronado  [address]  [facility] [address]        Dear Colleague,    Thank you for referring your patient, Nimesh Coronado, to the [facility] [place]. Please see a copy of my visit note below.    Video visit: Time spent on video call with patient was 17:10, ending at 1:53 pm    Chief Concern: follow up cervical spine MRI    HPI:  Nimesh is a very pleasant 47 year old male with history of anterior cervical discectomy and disc replacement with Dr. Rey in 2019 presenting with cervical radiculopathy and subjective symptoms of decreased manual dexterity and balance changes; no objective myelopathic signs. Following our previous clinic appointment on 3/22/2023 I recommended MRI cervical spine to evaluate for compressive pathology.     Today he reports no change in symptoms.          MRI shows mild to moderate right foraminal stenosis at C5-6, very mild foraminal stenosis at left C5-6, mild central stenosis although there is change in shape of the cord at this level there is CSF circumferentially around the cord on axial reconstruction    Plan:  Referral for physical therapy, massage, accupuncture  Work note provided; may work up to 6 hours per day until April 14, Up to 8 hours per day from April 14 to April 28  Will follow up with patient in one month to reevaluate and update work restrictions    Time spent on this clinical encounter including previsit chart review, history and physical examination, patient counseling and documentation was 25 minutes on the date of encounter.    Dmitri Rodriguez MD  Orthopedic Spine Surgeon  , Department of Orthopedic Surgery

## 2023-03-29 NOTE — NURSING NOTE
Reason For Visit:   Chief Complaint   Patient presents with     RECHECK     MRI cervical spine results review 3/23/23       Primary MD: Mauricio Vivar  Ref. MD: Corinne Silva     ?  No  Occupation .  Currently working? No.  Work status?  On medical leave.     Date of injury: No  Type of injury: did fall on 2/27/23.     Date of surgery: 10/1/2019  Type of surgery: Cervical disc replacement C5-6.     Smoker: No  Request smoking cessation information: No    There were no vitals taken for this visit.    Pain Assessment  Patient Currently in Pain: Yes  0-10 Pain Scale: 6  Primary Pain Location: Neck    Oswestry (FREDY) Questionnaire    No flowsheet data found.         Neck Disability Index (NDI) Questionnaire    Neck Disability Index (NDI) 3/22/2023   Neck Disability Index: Count 10   NDI: Total Score = SUM (points for all 10 findings) 34   Neck Disability in Percent = (Total Score) / 50 * 100 68 (%)   Some recent data might be hidden              Visual Analog Pain Scale  Back Pain Scale 0-10: 0  Right leg pain: 0  Left leg pain: 0  Neck Pain Scale 0-10: 6  Right arm pain: 0  Left arm pain: 0    Promis 10 Assessment    No flowsheet data found.             SUSANNA Tay

## 2023-03-29 NOTE — PROGRESS NOTES
Video visit: Time spent on video call with patient was 17:10, ending at 1:53 pm    Chief Concern: follow up cervical spine MRI    HPI:  Nimesh is a very pleasant 47 year old male with history of anterior cervical discectomy and disc replacement with Dr. Rey in 2019 presenting with cervical radiculopathy and subjective symptoms of decreased manual dexterity and balance changes; no objective myelopathic signs. Following our previous clinic appointment on 3/22/2023 I recommended MRI cervical spine to evaluate for compressive pathology.     Today he reports no change in symptoms.          MRI shows mild to moderate right foraminal stenosis at C5-6, very mild foraminal stenosis at left C5-6, mild central stenosis although there is change in shape of the cord at this level there is CSF circumferentially around the cord on axial reconstruction    Plan:  Referral for physical therapy, massage, accupuncture  Work note provided; may work up to 6 hours per day until April 14, Up to 8 hours per day from April 14 to April 28  Will follow up with patient in one month to reevaluate and update work restrictions    Time spent on this clinical encounter including previsit chart review, history and physical examination, patient counseling and documentation was 25 minutes on the date of encounter.    Dmitri Rodriguez MD  Orthopedic Spine Surgeon  , Department of Orthopedic Surgery

## 2023-03-29 NOTE — LETTER
[facility] [address]  [address]  [facility] [address]  Phone: [phone]  Fax: [phone]    March 30, 2023        Nimesh Coronado  [address]  [facility] [address]          To whom it may concern:    RE: Nimesh Coronado    Patient was seen by Orthopedics on 3/23/23 with following plan:    Referral for physical therapy, massage, accupuncture  Work note provided; may work up to 6 hours per day until April 14, Up to 8 hours per day from April 14 to April 28  Will follow up with patient in one month to reevaluate and update work restrictions    He can return to work with these restrictions until April 28 and follow-up with orthopedics again and discuss restrictions at that time.    Please contact me for questions or concerns.      Sincerely,        Paulina Alejandro NP on 3/30/2023 at 11:40 AM

## 2023-03-29 NOTE — LETTER
Nimesh LANDON Cliff     1975  Encounter date/time:  ***   [id number]    Report of Workability    Phone number:  ***  1975  Physician:  ***    Accident/Injury Information:  Fall with neck injury   Date of Injury:  ***  Diagnosis:  Neck pain with cervical radiculopathy    Limitations:  Lift/Carry maximun: *** lbs  Push/Pull maximum:  *** lbs  Seated duty only  Allowed to sit:  ***  minutes/hour  May stand/walk:  *** minutes/hour  May work up to:  6 hours/day up until 4/14, may work 8 hours/day from 4/14-4/28  May keyboard up to:  *** hours/day  May keyboard up to:  *** minutes/hour  No machine operation  No driving  Medication prescribed may interfere with work capacity/alertness    Hand/Wrist Restrictions:  No vibrating tools: {RIGHT/LEFT (ONLY):379053}  No coarse manipulation: {RIGHT/LEFT (ONLY):879564}  No twisting/crimping: {RIGHT/LEFT (ONLY):073748}  Low repetition hand/wrist: {RIGHT/LEFT (ONLY):758522}    Other Restrictions:  Squat/kneel: {[employer] ORTH[facility] OTHER RESTRICTIONS MAX UP TO:100500082}  Twist/turn: {[employer] ORTH[facility] OTHER RESTRICTIONS MAX UP TO:251424108}  Reach above shoulders: {[employer] ORTH[employer] OTHER RESTRICTIONS MAX UP TO:270120870}  Bend or reach below knees: {[employer] ORTH[facility] OTHER RESTRICTIONS MAX UP TO:040098691}  Comments:    Return to work status: {[employer] OR[employer] RETURN TO WORK OPTIONS:293179945}    Follow-Up:   {[employer] ORTHO FOLLOW UP OPTIONS:279101347}

## 2023-03-30 PROBLEM — M48.02 CERVICAL STENOSIS OF SPINE: Status: ACTIVE | Noted: 2023-03-30

## 2023-03-30 PROBLEM — Z98.890 S/P CERVICAL DISC REPLACEMENT: Status: ACTIVE | Noted: 2023-03-30

## 2023-03-30 PROBLEM — M54.12 CERVICAL RADICULOPATHY: Status: ACTIVE | Noted: 2023-03-30

## 2023-03-30 NOTE — TELEPHONE ENCOUNTER
See [employer]xim[employer] message, routing to provider for review.    Merle Yao RN  [facility]

## 2023-04-28 ENCOUNTER — TELEPHONE (OUTPATIENT)
Dept: FAMILY MEDICINE | Facility: CLINIC | Age: 48
End: 2023-04-28
Payer: COMMERCIAL

## 2023-04-28 NOTE — LETTER
April 28, 2023    To  Nimesh Coronado  [address]  [facility] [address]    Your team at [facility] cares about your health. We have reviewed your chart and based on our findings; we are making the following recommendations to better manage your health.     You are in particular need of attention regarding the following:     Call or MyChart message your clinic to schedule a colonoscopy, schedule/ a FIT Test, or order a Cologuard test. If you are unsure what type of test you need, please call your clinic and speak to clinic staff.   Colon cancer is now the second leading cause of cancer-related deaths in the [facility] for both men and women and there are over 130,000 new cases and 50,000 deaths per year from colon cancer. Colonoscopies can prevent 90-95% of these deaths. Problem lesions can be removed before they ever become cancer. This test is not only looking for cancer, but also getting rid of precancerous lesions.   If you are under/uninsured, we recommend you contact the [employer] Program.[employer] is a free colorectal cancer screening program that provides colonoscopies for eligible under/uninsured [place] men and women. If you are interested in receiving a free colonoscopy, please call [employer] at t [phone] (mention code [employer]) to see if you're eligible. Please have them send us the results.   Schedule an office visit with your provider if you are interested in completing your colon cancer screening with a Cologuard test    If you have already completed these items, please contact the clinic via phone or   [employer] so your care team can review and update your records. Thank you for   choosing [facility] for your healthcare needs. For any questions,   concerns, or to schedule an appointment please contact our clinic.    Healthy Regards,      Your [facility] Care Team

## 2023-04-28 NOTE — TELEPHONE ENCOUNTER
Patient Quality Outreach    Patient is due for the following:   Colon Cancer Screening    Next Steps:   pt to schedule    Type of outreach:    Sent letter.      Questions for provider review:    None     Eliot Bonds

## 2023-05-08 ENCOUNTER — VIRTUAL VISIT (OUTPATIENT)
Dept: ORTHOPEDICS | Facility: CLINIC | Age: 48
End: 2023-05-08
Payer: COMMERCIAL

## 2023-05-08 ENCOUNTER — TELEPHONE (OUTPATIENT)
Dept: ORTHOPEDICS | Facility: CLINIC | Age: 48
End: 2023-05-08

## 2023-05-08 DIAGNOSIS — M48.02 CERVICAL STENOSIS OF SPINE: ICD-10-CM

## 2023-05-08 DIAGNOSIS — M54.12 CERVICAL RADICULOPATHY: Primary | ICD-10-CM

## 2023-05-08 DIAGNOSIS — Z98.890 S/P CERVICAL DISC REPLACEMENT: ICD-10-CM

## 2023-05-08 DIAGNOSIS — G95.9 CERVICAL MYELOPATHY (H): ICD-10-CM

## 2023-05-08 PROCEDURE — 99213 OFFICE O/P EST LOW 20 MIN: CPT | Mod: VID | Performed by: ORTHOPAEDIC SURGERY

## 2023-05-08 NOTE — TELEPHONE ENCOUNTER
[facility] Call Center    Phone Message    May a detailed message be left on voicemail: yes     Reason for Call: Other: Nimesh called he has an appointment today with Dr. Rodriguez and he is sick. He is wanting to know if he could do a virtual visit with him or if he needs to be seen in person. Please call to discuss     Action Taken: Other: [facility]    Travel Screening: Not Applicable

## 2023-05-08 NOTE — LETTER
5/8/2023         RE: Nimesh Coronado  [address]  [facility] [place] [address]        Dear Colleague,    Thank you for referring your patient, Nimesh Coronado, to the [facility]. Please see a copy of my visit note below.    Since Saturday has been having symptoms consistent with URI including fevers, chills, migraines. With regard to neck and UE symptoms he has been working 4 hours per day and feels like he would be able to do much more. He would like to return to work without restrictions as long as he will be able to modify activities if and when he does develop debilitating symptoms.  His employer has indicated they are willing to work with him to allow him to work as many hours as he is comfortable.     He reports dexterity has improved considerable degree. Also reports the numbness he previously experienced is at 25% of where it was previously and may even be improved compared to pre-fall.      By video visit Nimesh appeared alert and oriented, no acute distress  nonlabored respirations, no audible wheeze  Had full voluntary motion of bilateral upper extremities    No new imaging today    Impression and Plan:  47 year old male with history C6-7 anterior cervical disc replacement with Dr Rey 2019 with adjacent segment stenosis at C5-6, left greater than right radiculopathy.  Has had improvement in myelopathic symptoms and feels he is overall doing much better. We will plan to continue with current PT/HEP regimen, gabapentin.  Agree with lifting work restrictions as long as he is able to modify activity as needed.      We will plan to follow up in person in 2-3 months.     Video Start Time 12:59 pm  Video End Time 1:07 pm  Additional time spent on this encounter for chart review, care coordination and documentation was 11 minutes on the day of encounter.     Dmitri Rodriguez MD  Orthopedic Spine Surgeon  , Department of Orthopedic Surgery

## 2023-05-08 NOTE — TELEPHONE ENCOUNTER
Writer called and talked with patient on the phone writer scheduled pt for a follow up 2 month appointment on 5/17/23 11 am.    Moni Lee LPN

## 2023-05-08 NOTE — LETTER
Nimesh Coronado     1975  Encounter date/time:   5/8/2023 1:00 pm  [id number]    Report of Workability    Phone number:  [phone]  1975  Physician:  Dmitri Rodriguez MD      Diagnosis:  Cervical stenosis, cervical radiculopathy, cervical myelopathy    Return to work status: Return to work with NO limitations starting 5/9/2023.      Follow-Up:   Return to clinic in 2-3 months for re-evaluation    Dmitri Rodriguez MD  Orthopedic Spine Surgeon  , Department of Orthopedic Surgery

## 2023-05-08 NOTE — NURSING NOTE
Reason For Visit:   Chief Complaint   Patient presents with     RECHECK     Video visit mychart follow up neck pain       Primary MD: Ghazala, Mauricio Leblanc  Ref. MD: Est    ?  No  Occupation .  Currently working? No.  Work status?  On medical leave.     Date of injury: No  Type of injury: did fall on 2/27/23.     Date of surgery: 10/1/2019  Type of surgery: Cervical disc replacement C5-6.     Smoker: No  Request smoking cessation information: No    There were no vitals taken for this visit.    Pain Assessment  Patient Currently in Pain: Yes  0-10 Pain Scale: 4  Primary Pain Location: Neck    Oswestry (FREDY) Questionnaire         View : No data to display.                     Neck Disability Index (NDI) Questionnaire        5/8/2023    12:37 PM   Neck Disability Index (NDI)   Neck Disability Index: Count 10   NDI: Total Score = SUM (points for all 10 findings) 21   Neck Disability in Percent = (Total Score) / 50 * 100 42 (%)              Visual Analog Pain Scale  Back Pain Scale 0-10: 0  Right leg pain: 0  Left leg pain: 0  Neck Pain Scale 0-10: 4  Right arm pain: 0  Left arm pain: 0    Promis 10 Assessment         View : No data to display.                         Moni Lee LPN

## 2023-05-08 NOTE — PROGRESS NOTES
Since Saturday has been having symptoms consistent with URI including fevers, chills, migraines. With regard to neck and UE symptoms he has been working 4 hours per day and feels like he would be able to do much more. He would like to return to work without restrictions as long as he will be able to modify activities if and when he does develop debilitating symptoms.  His employer has indicated they are willing to work with him to allow him to work as many hours as he is comfortable.     He reports dexterity has improved considerable degree. Also reports the numbness he previously experienced is at 25% of where it was previously and may even be improved compared to pre-fall.      By video visit Nimesh appeared alert and oriented, no acute distress  nonlabored respirations, no audible wheeze  Had full voluntary motion of bilateral upper extremities    No new imaging today    Impression and Plan:  47 year old male with history C6-7 anterior cervical disc replacement with Dr Rey 2019 with adjacent segment stenosis at C5-6, left greater than right radiculopathy.  Has had improvement in myelopathic symptoms and feels he is overall doing much better. We will plan to continue with current PT/HEP regimen, gabapentin.  Agree with lifting work restrictions as long as he is able to modify activity as needed.      We will plan to follow up in person in 2-3 months.     Video Start Time 12:59 pm  Video End Time 1:07 pm  Additional time spent on this encounter for chart review, care coordination and documentation was 11 minutes on the day of encounter.     Dmitri Rodriguez MD  Orthopedic Spine Surgeon  , Department of Orthopedic Surgery

## 2023-05-27 ENCOUNTER — HEALTH MAINTENANCE LETTER (OUTPATIENT)
Age: 48
End: 2023-05-27

## 2023-06-07 ENCOUNTER — HOSPITAL ENCOUNTER (EMERGENCY)
Facility: CLINIC | Age: 48
Discharge: HOME | End: 2023-06-07
Attending: FAMILY MEDICINE | Admitting: FAMILY MEDICINE
Payer: COMMERCIAL

## 2023-06-07 VITALS
BODY MASS INDEX: 31.37 KG/M2 | RESPIRATION RATE: 16 BRPM | SYSTOLIC BLOOD PRESSURE: 135 MMHG | TEMPERATURE: 97.3 F | OXYGEN SATURATION: 97 % | WEIGHT: 207 LBS | HEART RATE: 96 BPM | DIASTOLIC BLOOD PRESSURE: 94 MMHG | HEIGHT: 68 IN

## 2023-06-07 DIAGNOSIS — H10.33 ACUTE CONJUNCTIVITIS OF BOTH EYES, UNSPECIFIED ACUTE CONJUNCTIVITIS TYPE: ICD-10-CM

## 2023-06-07 PROCEDURE — 99283 EMERGENCY DEPT VISIT LOW MDM: CPT | Performed by: FAMILY MEDICINE

## 2023-06-07 ASSESSMENT — ACTIVITIES OF DAILY LIVING (ADL): ADLS_ACUITY_SCORE: 33

## 2023-06-07 ASSESSMENT — VISUAL ACUITY
OS: 20/40
OU: 20/40
OS: 20/40
OD: 20/50
OD: 20/50

## 2023-06-07 NOTE — ED TRIAGE NOTES
Exposed to black mold at work two weeks ago.  Took OTC eye drops and prescription eye drops.  Work up this morning with blurry vision in the right eye.  Sclera red. Puss coming from both eyes, wakes up with eyes crusted together.  Has not experienced that for two days.  Looking for a referral to eye doctor.     Triage Assessment     Row Name 06/07/23 3411       Triage Assessment (Adult)    Airway WDL WDL       Respiratory WDL    Respiratory WDL WDL       Skin Circulation/Temperature WDL    Skin Circulation/Temperature WDL WDL       Cardiac WDL    Cardiac WDL WDL       Peripheral/Neurovascular WDL    Peripheral Neurovascular WDL WDL       Cognitive/Neuro/Behavioral WDL    Cognitive/Neuro/Behavioral WDL WDL

## 2023-06-07 NOTE — ED PROVIDER NOTES
History     Chief Complaint   Patient presents with     Eye Problem     Exposed to black mold. Patient used OTC eye drops and prescription eye drops. Today right eye went blurry. Pain in the eye     HPI     Nimesh Coronado is a 47 year old male who comes in with bilateral conjunctival injection and discharge with crusting in the morning.  Symptoms began about 10 days ago.  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  He does have some mild URI symptoms of congestion and rhinorrhea.  He recently had some epistaxis.  He is not a contact lens wear and has not had eye surgery.  He does not have eye pain but he wakes up with crust in the morning.  He did a virtual visit and was prescribed ketotifen eyedrops which she has been using without any relief.  He is concerned that he has had blurring of the vision in his right eye as well as persistent conjunctival injection.    Allergies:  No Known Allergies    Problem List:    Patient Active Problem List    Diagnosis Date Noted     Cervical myelopathy (H) 05/08/2023     Priority: Medium     Cervical radiculopathy 03/30/2023     Priority: Medium     Cervical stenosis of spine 03/30/2023     Priority: Medium     S/P cervical disc replacement 03/30/2023     Priority: Medium        Past Medical History:    No past medical history on file.    Past Surgical History:    No past surgical history on file.    Family History:    No family history on file.    Social History:  Marital Status:  Single [1]  Social History     Tobacco Use     Smoking status: Never     Smokeless tobacco: Never   Vaping Use     Vaping status: Never Used        Medications:    amitriptyline (ELAVIL) 50 MG tablet  buPROPion (WELLBUTRIN XL) 150 MG 24 hr tablet  gabapentin (NEURONTIN) 300 MG capsule  ibuprofen (ADVIL/MOTRIN) 200 MG tablet  ketotifen (ZADITOR) 0.025 % ophthalmic solution  methocarbamol (ROBAXIN) 500 MG  "tablet          Review of Systems  All other systems are reviewed and are negative    Physical Exam   BP: (!) 117/90  Pulse: 92  Temp: 98.6  F (37  C)  Resp: 20  Height: 172.7 cm (5' 8\")  Weight: 93.9 kg (207 lb)  SpO2: 97 %      Physical Exam     Nursing note and vitals were reviewed.  Constitutional: Awake and alert, adequately nourished and developed appearing 47-year-old in no apparent discomfort, who does not appear acutely ill, and who answers questions appropriately and cooperates with examination.  HEENT: Bilateral conjunctival injection is present without ciliary blush.  Slit-lamp examination was performed.  Lids and lashes are normal.  Conjunctival injection is present.  Cornea is intact.  Anterior chamber is clear.  Fluorescein staining is normal.  PERRL.  EOMI.       ED Course                 Procedures              Critical Care time:  none               No results found for this or any previous visit (from the past 24 hour(s)).    Medications - No data to display    Assessments & Plan (with Medical Decision Making)     47-year-old presents with 10 days of conjunctival injection with discharge and crusting.  No decrease in visual acuity.  Slit-lamp examination shows conjunctival injection without ciliary blush and is otherwise reassuring.  He is concerned about an allergic reaction to black mold.  He has not responded to ketotifen drops.  I discussed with him that the black mold is not likely the cause and most likely this is viral conjunctivitis.  I do not have a suspicion for bacterial conjunctivitis.  His cornea is intact.  We discussed that iritis can sometimes be difficult to identify in the emergency department and I would like him to see [facility] for their examination and opinion.    I have reviewed the nursing notes.    I have reviewed the findings, diagnosis, plan and need for follow up with the patient.           New Prescriptions    No medications on file       Final diagnoses:   Acute " conjunctivitis of both eyes, unspecified acute conjunctivitis type       6/7/2023   [facility] EMERGENCY DEPT     Jn Bowser MD  06/07/23 6300

## 2023-07-03 ENCOUNTER — TELEPHONE (OUTPATIENT)
Dept: ORTHOPEDICS | Facility: CLINIC | Age: 48
End: 2023-07-03
Payer: COMMERCIAL

## 2023-07-14 ENCOUNTER — TELEPHONE (OUTPATIENT)
Dept: ORTHOPEDICS | Facility: CLINIC | Age: 48
End: 2023-07-14
Payer: COMMERCIAL

## 2023-08-28 ENCOUNTER — TELEPHONE (OUTPATIENT)
Dept: FAMILY MEDICINE | Facility: CLINIC | Age: 48
End: 2023-08-28
Payer: COMMERCIAL

## 2023-08-28 NOTE — TELEPHONE ENCOUNTER
Patient Quality Outreach    Patient is due for the following:   Colon Cancer Screening    Next Steps:   Pt  to schedule    Type of outreach:    letter      Questions for provider review:    None           Eliot Bonds

## 2023-08-28 NOTE — LETTER
August 28, 2023    To  Nimesh Coronado  [address]  [facility] [address]    Your team at [facility] cares about your health. We have reviewed your chart and based on our findings; we are making the following recommendations to better manage your health.     You are in particular need of attention regarding the following:     Call or MyChart message your clinic to schedule a colonoscopy, schedule/ a FIT Test, or order a Cologuard test. If you are unsure what type of test you need, please call your clinic and speak to clinic staff.   Colon cancer is now the second leading cause of cancer-related deaths in the [place] [facility] [place] for both men and women and there are over 130,000 new cases and 50,000 deaths per year from colon cancer. Colonoscopies can prevent 90-95% of these deaths. Problem lesions can be removed before they ever become cancer. This test is not only looking for cancer, but also getting rid of precancerous lesions.   If you are under/uninsured, we recommend you contact the [employer] Program.[employer] is a free colorectal cancer screening program that provides colonoscopies for eligible under/uninsured [place] men and women. If you are interested in receiving a free colonoscopy, please call [employer] at t [phone] (mention code [employer]) to see if you're eligible. Please have them send us the results.     If you have already completed these items, please contact the clinic via phone or   [employer] so your care team can review and update your records. Thank you for   choosing [facility] for your healthcare needs. For any questions,   concerns, or to schedule an appointment please contact our clinic.    Healthy Regards,      Your [facility] Care Team

## 2024-03-01 ENCOUNTER — TELEPHONE (OUTPATIENT)
Dept: FAMILY MEDICINE | Facility: CLINIC | Age: 49
End: 2024-03-01
Payer: COMMERCIAL

## 2024-03-01 NOTE — LETTER
March 1, 2024    To  Nimesh Coronado  [address]  [facility] [address]    Your team at [facility] cares about your health. We have reviewed your chart and based on our findings; we are making the following recommendations to better manage your health.     You are in particular need of attention regarding the following:     Call or MyChart message your clinic to schedule a colonoscopy, schedule/ a FIT Test, or order a Cologuard test. If you are unsure what type of test you need, please call your clinic and speak to clinic staff.   Colon cancer is now the second leading cause of cancer-related deaths in the [facility] for both men and women and there are over 130,000 new cases and 50,000 deaths per year from colon cancer. Colonoscopies can prevent 90-95% of these deaths. Problem lesions can be removed before they ever become cancer. This test is not only looking for cancer, but also getting rid of precancerous lesions.   If you are under/uninsured, we recommend you contact the [employer] Program.[employer] is a free colorectal cancer screening program that provides colonoscopies for eligible under/uninsured [place] men and women. If you are interested in receiving a free colonoscopy, please call [employer] at t [phone] (mention code [employer]) to see if you're eligible. Please have them send us the results.   PREVENTATIVE VISIT: Physical    If you have already completed these items, please contact the clinic via phone or   [employer] so your care team can review and update your records. Thank you for   choosing [facility] for your healthcare needs. For any questions,   concerns, or to schedule an appointment please contact our clinic.    Healthy Regards,      Your [facility] Care Team

## 2024-03-01 NOTE — TELEPHONE ENCOUNTER
Patient Quality Outreach    Patient is due for the following:   Colon Cancer Screening  Physical Preventive Adult Physical    Next Steps:   Pt to schedule    Type of outreach:    Sent letter.      Questions for provider review:    None           Eliot Bonds

## 2024-08-03 ENCOUNTER — HEALTH MAINTENANCE LETTER (OUTPATIENT)
Age: 49
End: 2024-08-03

## 2025-08-16 ENCOUNTER — HEALTH MAINTENANCE LETTER (OUTPATIENT)
Age: 50
End: 2025-08-16